# Patient Record
Sex: MALE | URBAN - METROPOLITAN AREA
[De-identification: names, ages, dates, MRNs, and addresses within clinical notes are randomized per-mention and may not be internally consistent; named-entity substitution may affect disease eponyms.]

---

## 2017-05-16 ENCOUNTER — APPOINTMENT (RX ONLY)
Dept: URBAN - METROPOLITAN AREA OTHER 13 | Facility: OTHER | Age: 82
Setting detail: DERMATOLOGY
End: 2017-05-16

## 2017-05-16 DIAGNOSIS — Z41.9 ENCOUNTER FOR PROCEDURE FOR PURPOSES OTHER THAN REMEDYING HEALTH STATE, UNSPECIFIED: ICD-10-CM

## 2017-05-16 PROCEDURE — ? FACIAL

## 2017-05-16 ASSESSMENT — LOCATION DETAILED DESCRIPTION DERM: LOCATION DETAILED: RIGHT INFERIOR MEDIAL FOREHEAD

## 2017-05-16 ASSESSMENT — LOCATION ZONE DERM: LOCATION ZONE: FACE

## 2017-05-16 ASSESSMENT — LOCATION SIMPLE DESCRIPTION DERM: LOCATION SIMPLE: RIGHT FOREHEAD

## 2017-05-16 NOTE — PROCEDURE: FACIAL
Treatment Type Override: Facial
Price (Use Numbers Only, No Special Characters Or $): 75.00
Exfoliation Type: gentle
Detail Level: Detailed
Comments (Sticky): The treated area was pre cleansed with Gentle Cleanser and cleansed with Simply Clean. Face was then  toned with Clarifying Solution. Dermaplane was perform. Steamed with Sean Masque and retexturing activator for 8 mins. Extractions were performed on milia. Facial massage was given. Phytocorrective and B5 masque was left on for 7 mins. Epidermal and Sunscreen was used after procedure.
Extraction Method: extractor

## 2018-04-30 ENCOUNTER — RX ONLY (OUTPATIENT)
Age: 83
Setting detail: RX ONLY
End: 2018-04-30

## 2018-04-30 RX ORDER — SULFACETAMIDE SODIUM AND SULFUR 10; 5 MG/G; MG/G
RINSE TOPICAL
Qty: 1 | Refills: 5 | Status: ERX

## 2018-04-30 RX ORDER — DESONIDE 0.5 MG/G
CREAM TOPICAL
Qty: 1 | Refills: 3 | Status: ERX